# Patient Record
Sex: FEMALE | Race: WHITE | NOT HISPANIC OR LATINO | Employment: OTHER | URBAN - NONMETROPOLITAN AREA
[De-identification: names, ages, dates, MRNs, and addresses within clinical notes are randomized per-mention and may not be internally consistent; named-entity substitution may affect disease eponyms.]

---

## 2018-05-15 ENCOUNTER — APPOINTMENT (OUTPATIENT)
Dept: OCCUPATIONAL MEDICINE | Facility: CLINIC | Age: 23
End: 2018-05-15

## 2018-05-15 PROCEDURE — 86580 TB INTRADERMAL TEST: CPT | Performed by: NURSE PRACTITIONER

## 2018-05-29 ENCOUNTER — APPOINTMENT (OUTPATIENT)
Dept: OCCUPATIONAL MEDICINE | Facility: CLINIC | Age: 23
End: 2018-05-29

## 2021-12-17 ENCOUNTER — TRANSFERRED RECORDS (OUTPATIENT)
Dept: MULTI SPECIALTY CLINIC | Facility: CLINIC | Age: 26
End: 2021-12-17

## 2021-12-17 LAB — PAP-ABSTRACT: NORMAL

## 2023-02-07 ENCOUNTER — VIRTUAL VISIT (OUTPATIENT)
Dept: FAMILY MEDICINE | Facility: CLINIC | Age: 28
End: 2023-02-07
Payer: COMMERCIAL

## 2023-02-07 DIAGNOSIS — Z33.1 PREGNANT STATE, INCIDENTAL: ICD-10-CM

## 2023-02-07 DIAGNOSIS — F43.10 PTSD (POST-TRAUMATIC STRESS DISORDER): Primary | ICD-10-CM

## 2023-02-07 PROCEDURE — 99207 PR NO CHARGE BEHAVIORAL WARM HANDOFF: CPT | Performed by: NURSE PRACTITIONER

## 2023-02-07 ASSESSMENT — PATIENT HEALTH QUESTIONNAIRE - PHQ9: SUM OF ALL RESPONSES TO PHQ QUESTIONS 1-9: 12

## 2023-02-07 NOTE — PROGRESS NOTES
Staci is a 27 year old who is being evaluated via a billable video visit.      How would you like to obtain your AVS? MyChart  If the video visit is dropped, the invitation should be resent by: Text to cell phone: [phone]  Will anyone else be joining your video visit? No        Assessment & Plan     PTSD (post-traumatic stress disorder)      Pregnant state, incidental         Depression Screening Follow Up    PHQ 2023   PHQ-9 Total Score 12   Q9: Thoughts of better off dead/self-harm past 2 weeks Not at all     Last PHQ-9 2023   1.  Little interest or pleasure in doing things 2   2.  Feeling down, depressed, or hopeless 2   3.  Trouble falling or staying asleep, or sleeping too much 1   4.  Feeling tired or having little energy 2   5.  Poor appetite or overeating 3   6.  Feeling bad about yourself 1   7.  Trouble concentrating 1   8.  Moving slowly or restless 0   Q9: Thoughts of better off dead/self-harm past 2 weeks 0   PHQ-9 Total Score 12   Difficulty at work, home, or with people Very difficult       Follow Up Actions Taken  Crisis resource information provided in After Visit Summary  Patient counseled, no additional follow up at this time.  Referred patient back to current mental health provider.  Referred patient back to PCP     See Patient Instructions: Follow-up with primary care provider, psychiatrist.  Follow-up as needed here for any healthcare questions or concerns.    Return if symptoms worsen or fail to improve.    KARLEY BAUTISTA, JESSA  [facility] BRODY Small is a 27 year old, presenting for the following health issues:  Consult and Medication Reconciliation (Patient states she is on Zoloft, Wellbutrin and a pre- vitamin. )    Discussed with patient chart review shows that she is currently pregnant.  She reports she has been using medical marijuana for her PTSD related to childhood trauma.  She obtained her certification before becoming  "pregnant, she has been using medical marijuana during her pregnancy.  Medical marijuana certification in [place] is through the [place] [employer].  Discussed with patient that I looked up medical marijuana use while pregnant and per the [facility] on October 26, 2022-\" no amount of marijuana use during pregnancy or adolescence is known to be safe until and unless more is known about the long-term impact, the safest choice of pregnant women and adolescence is not to use marijuana.\"  Discussed with patient that I have to follow the [place] Department [employer] guidelines to be able to prescribe medical marijuana.  Obviously I do this job because I want to help people and not cause harm.  Advised her to discuss medical marijuana with her primary care provider and psychiatrist as they might be able to go outside these guidelines.  If she is feeling worse at any time she should follow-up right away.  If she waits until after baby is born we would be able to certify her for her PTSD at that time.  Will no bill visit as I am not able to help patient with her current concerns.  She reports her mental health is stable at this time.    HPI   Review of Systems   Constitutional, HEENT, cardiovascular, pulmonary, GI, , musculoskeletal, neuro, skin, endocrine and psych systems are negative, except as otherwise noted.      Objective           Vitals:  No vitals were obtained today due to virtual visit.    Physical Exam   GENERAL: Healthy, alert and no distress  EYES: Eyes grossly normal to inspection.  No discharge or erythema, or obvious scleral/conjunctival abnormalities.  RESP: No audible wheeze, cough, or visible cyanosis.  No visible retractions or increased work of breathing.    SKIN: Visible skin clear. No significant rash, abnormal pigmentation or lesions.  NEURO: Cranial nerves grossly intact.  Mentation and speech appropriate for age.  PSYCH: Mentation appears normal, affect " normal/bright, judgement and insight intact, normal speech and appearance well-groomed.

## 2023-05-03 ENCOUNTER — VIRTUAL VISIT (OUTPATIENT)
Dept: FAMILY MEDICINE | Facility: CLINIC | Age: 28
End: 2023-05-03
Payer: COMMERCIAL

## 2023-05-03 DIAGNOSIS — F43.10 PTSD (POST-TRAUMATIC STRESS DISORDER): Primary | ICD-10-CM

## 2023-05-03 DIAGNOSIS — Z79.899 MEDICAL MARIJUANA USE: ICD-10-CM

## 2023-05-03 PROCEDURE — 99214 OFFICE O/P EST MOD 30 MIN: CPT | Mod: VID | Performed by: NURSE PRACTITIONER

## 2023-05-03 RX ORDER — ACETAMINOPHEN 325 MG/1
325-650 TABLET ORAL
COMMUNITY
Start: 2023-04-17

## 2023-05-03 RX ORDER — IBUPROFEN 600 MG/1
600 TABLET, FILM COATED ORAL
COMMUNITY
Start: 2023-04-17

## 2023-05-03 RX ORDER — SERTRALINE HYDROCHLORIDE 100 MG/1
TABLET, FILM COATED ORAL DAILY
COMMUNITY
Start: 2023-04-05

## 2023-05-03 RX ORDER — ALBUTEROL SULFATE 90 UG/1
AEROSOL, METERED RESPIRATORY (INHALATION)
COMMUNITY
Start: 2023-04-14

## 2023-05-03 RX ORDER — BUPROPION HYDROCHLORIDE 150 MG/1
1 TABLET ORAL
COMMUNITY
Start: 2023-02-25

## 2023-05-03 NOTE — PROGRESS NOTES
Staci is a 28 year old who is being evaluated via a billable video visit.      How would you like to obtain your AVS? MyChart  If the video visit is dropped, the invitation should be resent by: Send to e-mail at: [email or web address]  Will anyone else be joining your video visit? No          Assessment & Plan     PTSD (post-traumatic stress disorder)  -Recertification in the medical marijuana program    Medical marijuana use  -Recertification the medical marijuana program      30 minutes spent by me on the date of the encounter doing chart review, history and exam, documentation and further activities per the note     See Patient Instructions: Follow-up as needed for healthcare questions or concerns.  Follow rules of the [place] Department of [facility] to remain certified in the medical marijuana program.  Review after visit summary tips.    KARLEY BAUTISTA, [facility] BRODY Small is a 28 year old, presenting for the following health issues:  Medical Marijuana    Chart reviewed we had a discussion in January about the risks of using medical marijuana while pregnant.  She has a history of PTSD related to her childhood trauma.  She now has a baby boy, her mental health is doing well although she feels at times the need to use her medical marijuana.  We discussed recertification at this time is appropriate.  Discussed her certification process, she should look for an email from the [place] [employer] within 24 hours if she does not receive this let us know.  She will have to complete the steps they request usually updating an ID to prove she lives in [place].  At that point she will schedule with her local dispensary.  She is to follow the rules of the [place] [employer] remains certified in the medical marijuana program, certification last 1 year.  She should not drive under the influence or share as this is a medication.  Discussed follow-up  if needed for other healthcare questions or concerns.  She has her other medication refills at this time and is, and is in agreement.      5/3/2023     7:24 AM   Additional Questions   Roomed by Yenni Troncoso MA   Accompanied by N/A         5/3/2023     7:24 AM   Patient Reported Additional Medications   Patient reports taking the following new medications Zoloft, Wellbutrin, pre-julienne vitamins, fish oil, Vitamin D     HPI     Patient would like to discuss getting medical marijuana as she had her baby on 4/15/23.      Review of Systems   Constitutional, HEENT, cardiovascular, pulmonary, GI, , musculoskeletal, neuro, skin, endocrine and psych systems are negative, except as otherwise noted.      Objective           Vitals:  No vitals were obtained today due to virtual visit.    Physical Exam   GENERAL: Healthy, alert and no distress  EYES: Eyes grossly normal to inspection.  No discharge or erythema, or obvious scleral/conjunctival abnormalities.  RESP: No audible wheeze, cough, or visible cyanosis.  No visible retractions or increased work of breathing.    SKIN: Visible skin clear. No significant rash, abnormal pigmentation or lesions.  NEURO: Cranial nerves grossly intact.  Mentation and speech appropriate for age.  PSYCH: Mentation appears normal, affect normal/bright, judgement and insight intact, normal speech and appearance well-groomed.    See orders      Video-Visit Details    Type of service:  Video Visit     Originating Location (pt. Location): Home  Distant Location (provider location):  Off-site  Platform used for Video Visit: [employer]

## 2023-05-06 ENCOUNTER — HEALTH MAINTENANCE LETTER (OUTPATIENT)
Age: 28
End: 2023-05-06

## 2024-07-13 ENCOUNTER — HEALTH MAINTENANCE LETTER (OUTPATIENT)
Age: 29
End: 2024-07-13

## 2025-07-19 ENCOUNTER — HEALTH MAINTENANCE LETTER (OUTPATIENT)
Age: 30
End: 2025-07-19